# Patient Record
Sex: FEMALE | Race: WHITE | HISPANIC OR LATINO | Employment: FULL TIME | URBAN - METROPOLITAN AREA
[De-identification: names, ages, dates, MRNs, and addresses within clinical notes are randomized per-mention and may not be internally consistent; named-entity substitution may affect disease eponyms.]

---

## 2017-07-11 ENCOUNTER — OFFICE VISIT (OUTPATIENT)
Dept: FAMILY MEDICINE | Facility: CLINIC | Age: 37
End: 2017-07-11
Payer: COMMERCIAL

## 2017-07-11 VITALS
DIASTOLIC BLOOD PRESSURE: 78 MMHG | OXYGEN SATURATION: 99 % | TEMPERATURE: 99 F | WEIGHT: 160.94 LBS | SYSTOLIC BLOOD PRESSURE: 134 MMHG | HEART RATE: 90 BPM | BODY MASS INDEX: 30.39 KG/M2 | RESPIRATION RATE: 18 BRPM | HEIGHT: 61 IN

## 2017-07-11 DIAGNOSIS — F41.9 ANXIETY: ICD-10-CM

## 2017-07-11 DIAGNOSIS — R09.A2 GLOBUS SENSATION: ICD-10-CM

## 2017-07-11 DIAGNOSIS — F33.2 SEVERE EPISODE OF RECURRENT MAJOR DEPRESSIVE DISORDER, WITHOUT PSYCHOTIC FEATURES: ICD-10-CM

## 2017-07-11 PROBLEM — F33.9 RECURRENT MAJOR DEPRESSIVE DISORDER: Status: ACTIVE | Noted: 2017-07-11

## 2017-07-11 PROCEDURE — 99213 OFFICE O/P EST LOW 20 MIN: CPT | Mod: S$GLB,,, | Performed by: FAMILY MEDICINE

## 2017-07-11 RX ORDER — ESCITALOPRAM OXALATE 10 MG/1
10 TABLET ORAL DAILY
Qty: 30 TABLET | Refills: 11 | Status: SHIPPED | OUTPATIENT
Start: 2017-07-11 | End: 2017-08-11 | Stop reason: SDUPTHER

## 2017-07-11 RX ORDER — CLONAZEPAM 0.25 MG/1
0.25 TABLET, ORALLY DISINTEGRATING ORAL 2 TIMES DAILY
Qty: 60 TABLET | Refills: 2 | Status: SHIPPED | OUTPATIENT
Start: 2017-07-11 | End: 2018-07-03 | Stop reason: SDUPTHER
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
## 2017-08-11 NOTE — TELEPHONE ENCOUNTER
Please send to [facility] in [place]. Requesting 90 day supply      escitalopram oxalate (LEXAPRO) 10 MG tablet  Take 1 tablet (10 mg total) by mouth once daily.   Normal, Disp-90 tablet, R-3

## 2017-08-12 RX ORDER — ESCITALOPRAM OXALATE 10 MG/1
10 TABLET ORAL DAILY
Qty: 90 TABLET | Refills: 3 | Status: SHIPPED | OUTPATIENT
Start: 2017-08-12 | End: 2018-07-03 | Stop reason: SDUPTHER

## 2018-07-03 ENCOUNTER — OFFICE VISIT (OUTPATIENT)
Dept: FAMILY MEDICINE | Facility: CLINIC | Age: 38
End: 2018-07-03
Payer: COMMERCIAL

## 2018-07-03 VITALS
SYSTOLIC BLOOD PRESSURE: 160 MMHG | HEART RATE: 72 BPM | DIASTOLIC BLOOD PRESSURE: 110 MMHG | BODY MASS INDEX: 30.81 KG/M2 | OXYGEN SATURATION: 98 % | HEIGHT: 61 IN | TEMPERATURE: 98 F | WEIGHT: 163.19 LBS

## 2018-07-03 DIAGNOSIS — F41.9 ANXIETY: Primary | ICD-10-CM

## 2018-07-03 DIAGNOSIS — I10 ESSENTIAL HYPERTENSION: ICD-10-CM

## 2018-07-03 DIAGNOSIS — K59.00 CONSTIPATION, UNSPECIFIED CONSTIPATION TYPE: ICD-10-CM

## 2018-07-03 DIAGNOSIS — F32.A DEPRESSION, UNSPECIFIED DEPRESSION TYPE: ICD-10-CM

## 2018-07-03 DIAGNOSIS — R09.A2 GLOBUS SENSATION: ICD-10-CM

## 2018-07-03 PROBLEM — F33.9 RECURRENT MAJOR DEPRESSIVE DISORDER: Status: RESOLVED | Noted: 2017-07-11 | Resolved: 2018-07-03

## 2018-07-03 PROCEDURE — 99213 OFFICE O/P EST LOW 20 MIN: CPT | Mod: S$GLB,,, | Performed by: FAMILY MEDICINE

## 2018-07-03 PROCEDURE — 3008F BODY MASS INDEX DOCD: CPT | Mod: CPTII,S$GLB,, | Performed by: FAMILY MEDICINE

## 2018-07-03 RX ORDER — ESCITALOPRAM OXALATE 10 MG/1
10 TABLET ORAL DAILY
Qty: 90 TABLET | Refills: 3 | Status: SHIPPED | OUTPATIENT
Start: 2018-07-03 | End: 2019-08-07 | Stop reason: SDUPTHER

## 2018-07-03 RX ORDER — POLYETHYLENE GLYCOL 3350 17 G/17G
17 POWDER, FOR SOLUTION ORAL 2 TIMES DAILY PRN
Qty: 30 PACKET | Refills: 11 | Status: SHIPPED | OUTPATIENT
Start: 2018-07-03 | End: 2021-06-23

## 2018-07-03 RX ORDER — LOSARTAN POTASSIUM 100 MG/1
100 TABLET ORAL DAILY
Qty: 90 TABLET | Refills: 3 | Status: SHIPPED | OUTPATIENT
Start: 2018-07-03 | End: 2021-06-23 | Stop reason: ALTCHOICE

## 2018-07-03 RX ORDER — CLONAZEPAM 0.25 MG/1
0.25 TABLET, ORALLY DISINTEGRATING ORAL 2 TIMES DAILY
Qty: 60 TABLET | Refills: 2 | Status: SHIPPED | OUTPATIENT
Start: 2018-07-03 | End: 2021-06-23 | Stop reason: ALTCHOICE

## 2018-07-03 NOTE — PROGRESS NOTES
Subjective:      Patient ID: Janine Stubbs is a 38 y.o. female.    Chief Complaint: Follow-up and Constipation (haven't had a bowel movement in 4-5 days)      HPI   Follow up of globus sensation; doing well; no hypoglycemic episodes; makes her drowsy; takes it HS; workers notificed a difference    Review of Systems   Constitutional: Negative.    HENT: Negative.    Respiratory: Negative.    Cardiovascular: Negative.    Gastrointestinal: Positive for constipation.   Endocrine: Negative.    Genitourinary: Negative.    Musculoskeletal: Negative.    Neurological: Positive for headaches.   Psychiatric/Behavioral: Negative.    All other systems reviewed and are negative.    Objective:     Physical Exam   Constitutional: She is oriented to person, place, and time. She appears well-developed and well-nourished.   HENT:   Head: Normocephalic.   Eyes: Conjunctivae and EOM are normal. Pupils are equal, round, and reactive to light.   Neck: Normal range of motion. Neck supple.   Cardiovascular: Normal rate, regular rhythm and normal heart sounds.    bp elevated   Pulmonary/Chest: Effort normal and breath sounds normal.   Musculoskeletal: Normal range of motion.   Neurological: She is alert and oriented to person, place, and time. She has normal reflexes.   Skin: Skin is warm and dry.   Psychiatric: She has a normal mood and affect. Her behavior is normal. Judgment and thought content normal.   Nursing note and vitals reviewed.    Assessment:     1. Anxiety    2. Globus sensation    3. Depression, unspecified depression type    4. Essential hypertension    5. Constipation, unspecified constipation type      Plan:     New Prescriptions    LOSARTAN (COZAAR) 100 MG TABLET    Take 1 tablet (100 mg total) by mouth once daily.    POLYETHYLENE GLYCOL (GLYCOLAX) 17 GRAM PWPK    Take 17 g by mouth 2 (two) times daily as needed.     Discontinued Medications    No medications on file     Modified Medications    Modified Medication  Previous Medication    CLONAZEPAM (KLONOPIN) 0.25 MG TBDL clonazePAM (KLONOPIN) 0.25 MG TbDL       Take 1 tablet (0.25 mg total) by mouth 2 (two) times daily.    Take 1 tablet (0.25 mg total) by mouth 2 (two) times daily.    ESCITALOPRAM OXALATE (LEXAPRO) 10 MG TABLET escitalopram oxalate (LEXAPRO) 10 MG tablet       Take 1 tablet (10 mg total) by mouth once daily.    Take 1 tablet (10 mg total) by mouth once daily.       Anxiety  -     CBC auto differential; Future; Expected date: 07/03/2018  -     Comprehensive metabolic panel; Future; Expected date: 07/03/2018  -     Lipid panel; Future  -     TSH; Future  -     Urinalysis; Future    Globus sensation  -     CBC auto differential; Future; Expected date: 07/03/2018  -     Comprehensive metabolic panel; Future; Expected date: 07/03/2018  -     Lipid panel; Future  -     TSH; Future  -     Urinalysis; Future    Depression, unspecified depression type  -     CBC auto differential; Future; Expected date: 07/03/2018  -     Comprehensive metabolic panel; Future; Expected date: 07/03/2018  -     Lipid panel; Future  -     TSH; Future  -     Urinalysis; Future    Essential hypertension  -     CBC auto differential; Future; Expected date: 07/03/2018  -     Comprehensive metabolic panel; Future; Expected date: 07/03/2018  -     Lipid panel; Future  -     TSH; Future  -     Urinalysis; Future    Constipation, unspecified constipation type  -     CBC auto differential; Future; Expected date: 07/03/2018  -     Comprehensive metabolic panel; Future; Expected date: 07/03/2018  -     Lipid panel; Future  -     TSH; Future  -     Urinalysis; Future    Other orders  -     clonazePAM (KLONOPIN) 0.25 MG TbDL; Take 1 tablet (0.25 mg total) by mouth 2 (two) times daily.  Dispense: 60 tablet; Refill: 2  -     escitalopram oxalate (LEXAPRO) 10 MG tablet; Take 1 tablet (10 mg total) by mouth once daily.  Dispense: 90 tablet; Refill: 3  -     losartan (COZAAR) 100 MG tablet; Take 1 tablet  (100 mg total) by mouth once daily.  Dispense: 90 tablet; Refill: 3  -     polyethylene glycol (GLYCOLAX) 17 gram PwPk; Take 17 g by mouth 2 (two) times daily as needed.  Dispense: 30 packet; Refill: 11    start losartan, refill other meds, RTC 3 weeks, check labs

## 2018-07-06 ENCOUNTER — LAB VISIT (OUTPATIENT)
Dept: LAB | Facility: HOSPITAL | Age: 38
End: 2018-07-06
Attending: FAMILY MEDICINE
Payer: COMMERCIAL

## 2018-07-06 DIAGNOSIS — K59.00 CONSTIPATION, UNSPECIFIED CONSTIPATION TYPE: ICD-10-CM

## 2018-07-06 DIAGNOSIS — R09.A2 GLOBUS SENSATION: ICD-10-CM

## 2018-07-06 DIAGNOSIS — I10 ESSENTIAL HYPERTENSION: ICD-10-CM

## 2018-07-06 DIAGNOSIS — F32.A DEPRESSION, UNSPECIFIED DEPRESSION TYPE: ICD-10-CM

## 2018-07-06 DIAGNOSIS — F41.9 ANXIETY: ICD-10-CM

## 2018-07-06 LAB
ALBUMIN SERPL BCP-MCNC: 4.6 G/DL
ALP SERPL-CCNC: 73 U/L
ALT SERPL W/O P-5'-P-CCNC: 18 U/L
ANION GAP SERPL CALC-SCNC: 9 MMOL/L
AST SERPL-CCNC: 20 U/L
BASOPHILS # BLD AUTO: 0.02 K/UL
BASOPHILS NFR BLD: 0.3 %
BILIRUB SERPL-MCNC: 0.4 MG/DL
BUN SERPL-MCNC: 15 MG/DL
CALCIUM SERPL-MCNC: 8.9 MG/DL
CHLORIDE SERPL-SCNC: 102 MMOL/L
CHOLEST SERPL-MCNC: 254 MG/DL
CHOLEST/HDLC SERPL: 4.5 {RATIO}
CO2 SERPL-SCNC: 28 MMOL/L
CREAT SERPL-MCNC: 0.83 MG/DL
DIFFERENTIAL METHOD: ABNORMAL
EOSINOPHIL # BLD AUTO: 0.1 K/UL
EOSINOPHIL NFR BLD: 1.2 %
ERYTHROCYTE [DISTWIDTH] IN BLOOD BY AUTOMATED COUNT: 14.2 %
EST. GFR  (AFRICAN AMERICAN): >60 ML/MIN/1.73 M^2
EST. GFR  (NON AFRICAN AMERICAN): >60 ML/MIN/1.73 M^2
GLUCOSE SERPL-MCNC: 89 MG/DL
HCT VFR BLD AUTO: 45 %
HDLC SERPL-MCNC: 57 MG/DL
HDLC SERPL: 22.4 %
HGB BLD-MCNC: 14.3 G/DL
LDLC SERPL CALC-MCNC: 159.8 MG/DL
LYMPHOCYTES # BLD AUTO: 2.1 K/UL
LYMPHOCYTES NFR BLD: 30.1 %
MCH RBC QN AUTO: 27 PG
MCHC RBC AUTO-ENTMCNC: 31.8 G/DL
MCV RBC AUTO: 85 FL
MONOCYTES # BLD AUTO: 0.6 K/UL
MONOCYTES NFR BLD: 8.4 %
NEUTROPHILS # BLD AUTO: 4.1 K/UL
NEUTROPHILS NFR BLD: 60 %
NONHDLC SERPL-MCNC: 197 MG/DL
PLATELET # BLD AUTO: 329 K/UL
PMV BLD AUTO: 10.4 FL
POTASSIUM SERPL-SCNC: 4.5 MMOL/L
PROT SERPL-MCNC: 8.6 G/DL
RBC # BLD AUTO: 5.29 M/UL
SODIUM SERPL-SCNC: 139 MMOL/L
TRIGL SERPL-MCNC: 186 MG/DL
TSH SERPL DL<=0.005 MIU/L-ACNC: 1.4 UIU/ML
WBC # BLD AUTO: 6.8 K/UL

## 2018-07-06 PROCEDURE — 85025 COMPLETE CBC W/AUTO DIFF WBC: CPT | Mod: PO

## 2018-07-06 PROCEDURE — 36415 COLL VENOUS BLD VENIPUNCTURE: CPT | Mod: PO

## 2018-07-06 PROCEDURE — 80061 LIPID PANEL: CPT

## 2018-07-06 PROCEDURE — 80053 COMPREHEN METABOLIC PANEL: CPT | Mod: PO

## 2018-07-06 PROCEDURE — 84443 ASSAY THYROID STIM HORMONE: CPT | Mod: PO

## 2018-07-09 ENCOUNTER — TELEPHONE (OUTPATIENT)
Dept: FAMILY MEDICINE | Facility: CLINIC | Age: 38
End: 2018-07-09

## 2018-07-09 DIAGNOSIS — R77.9 ELEVATED BLOOD PROTEIN: ICD-10-CM

## 2018-07-09 DIAGNOSIS — E78.5 HYPERLIPIDEMIA, UNSPECIFIED HYPERLIPIDEMIA TYPE: Primary | ICD-10-CM

## 2018-07-09 NOTE — TELEPHONE ENCOUNTER
Left message advising patient of results; Results letter mailed.          ----- Message from Brody Omer MD sent at 7/9/2018  8:13 AM CDT -----  Cholesterol high; also, protein in blood slightly elevated; low cholesterol diet, repeat lipid and CMP 3 months

## 2018-07-17 ENCOUNTER — CLINICAL SUPPORT (OUTPATIENT)
Dept: FAMILY MEDICINE | Facility: CLINIC | Age: 38
End: 2018-07-17
Payer: COMMERCIAL

## 2018-07-17 ENCOUNTER — TELEPHONE (OUTPATIENT)
Dept: FAMILY MEDICINE | Facility: CLINIC | Age: 38
End: 2018-07-17

## 2018-07-17 VITALS — HEART RATE: 80 BPM | OXYGEN SATURATION: 99 % | SYSTOLIC BLOOD PRESSURE: 118 MMHG | DIASTOLIC BLOOD PRESSURE: 82 MMHG

## 2018-07-17 DIAGNOSIS — I10 ESSENTIAL HYPERTENSION: Primary | ICD-10-CM

## 2018-07-17 NOTE — PROGRESS NOTES
Janine Stubbs 38 y.o. female is here today for Blood Pressure check.   History of HTN yes.    Review of patient's allergies indicates:  No Known Allergies  Creatinine   Date Value Ref Range Status   07/06/2018 0.83 0.50 - 1.40 mg/dL Final     Sodium   Date Value Ref Range Status   07/06/2018 139 136 - 145 mmol/L Final     Potassium   Date Value Ref Range Status   07/06/2018 4.5 3.5 - 5.1 mmol/L Final   ]  Patient verifies taking blood pressure medications on a regular basis at the same time of the day.     Current Outpatient Prescriptions:     clonazePAM (KLONOPIN) 0.25 MG TbDL, Take 1 tablet (0.25 mg total) by mouth 2 (two) times daily., Disp: 60 tablet, Rfl: 2    escitalopram oxalate (LEXAPRO) 10 MG tablet, Take 1 tablet (10 mg total) by mouth once daily., Disp: 90 tablet, Rfl: 3    losartan (COZAAR) 100 MG tablet, Take 1 tablet (100 mg total) by mouth once daily., Disp: 90 tablet, Rfl: 3    polyethylene glycol (GLYCOLAX) 17 gram PwPk, Take 17 g by mouth 2 (two) times daily as needed., Disp: 30 packet, Rfl: 11  Does patient have record of home blood pressure readings yes. See Bp Log.   Last dose of blood pressure medication was taken at 4pm yesterday.  Patient is asymptomatic.   Complains of weakness due to low blood sugars during her cycle      Blood pressure reading after 15 minutes was 118/82, Pulse 80.  Dr. Omer was  notified.

## 2018-07-17 NOTE — TELEPHONE ENCOUNTER
Janine was here today for Blood Pressure check.     Patient verifies taking blood pressure medications on a regular basis at the same time of the day. She Takes her meds at 4 pm daily   she is taking     losartan (COZAAR) 100 MG tablet, Take 1 tablet daily     See Bp Log scanned into media   Last dose of blood pressure medication was taken at 4pm yesterday.    Complains of weakness due to low blood sugars during her cycle that have seem to have worsened since starting the new BP med   Please advise       Blood pressure was 118/82, Pulse 80 here at the office.

## 2019-01-21 ENCOUNTER — PATIENT OUTREACH (OUTPATIENT)
Dept: ADMINISTRATIVE | Facility: HOSPITAL | Age: 39
End: 2019-01-21

## 2019-02-18 ENCOUNTER — PATIENT OUTREACH (OUTPATIENT)
Dept: FAMILY MEDICINE | Facility: CLINIC | Age: 39
End: 2019-02-18

## 2019-03-20 ENCOUNTER — PATIENT OUTREACH (OUTPATIENT)
Dept: ADMINISTRATIVE | Facility: HOSPITAL | Age: 39
End: 2019-03-20

## 2019-04-05 DIAGNOSIS — Z12.39 BREAST CANCER SCREENING: ICD-10-CM

## 2019-08-01 ENCOUNTER — TELEPHONE (OUTPATIENT)
Dept: ADMINISTRATIVE | Facility: HOSPITAL | Age: 39
End: 2019-08-01

## 2019-08-07 ENCOUNTER — PATIENT OUTREACH (OUTPATIENT)
Dept: ADMINISTRATIVE | Facility: HOSPITAL | Age: 39
End: 2019-08-07

## 2019-08-07 RX ORDER — ESCITALOPRAM OXALATE 10 MG/1
TABLET ORAL
Qty: 90 TABLET | Refills: 3 | Status: SHIPPED | OUTPATIENT
Start: 2019-08-07 | End: 2021-06-23

## 2019-09-12 ENCOUNTER — TELEPHONE (OUTPATIENT)
Dept: ADMINISTRATIVE | Facility: OTHER | Age: 39
End: 2019-09-12

## 2019-09-16 ENCOUNTER — TELEPHONE (OUTPATIENT)
Dept: ADMINISTRATIVE | Facility: OTHER | Age: 39
End: 2019-09-16

## 2019-09-16 NOTE — TELEPHONE ENCOUNTER
----- Message from Rashida Powell RN sent at 9/12/2019  3:27 PM CDT -----  Regarding: FW: External Patient Referral  Can you please schedule pt for consult?    Thanks  ----- Message -----  From: Leslie Alaniz MD  Sent: 9/12/2019   3:21 PM  To: Rashida Powell RN  Subject: RE: External Patient Referral                    Sure  ----- Message -----  From: Rashida Powell RN  Sent: 9/12/2019   1:48 PM  To: Leslie Alaniz MD  Subject: FW: External Patient Referral                    Is it okay to schedule pt for a consult?    Rashida  ----- Message -----  From: Freda Nova  Sent: 9/11/2019   1:19 PM  To: [facility] Staff  Subject: External Patient Referral                        Good Afternoon,    Dr Gerardo would like to refer the following patient to Dr. Alaniz in the GYN ONC department. The patients diagnosis is palpable nodule in vulva below 11:00 position under skin palpable and tender. I have scanned the patients referral and records into .      Please let me know if I can help schedule in any way.    Thank you,   Freda Velásquez

## 2019-10-15 ENCOUNTER — TELEPHONE (OUTPATIENT)
Dept: GYNECOLOGIC ONCOLOGY | Facility: CLINIC | Age: 39
End: 2019-10-15

## 2019-10-15 ENCOUNTER — INITIAL CONSULT (OUTPATIENT)
Dept: GYNECOLOGIC ONCOLOGY | Facility: CLINIC | Age: 39
End: 2019-10-15
Payer: COMMERCIAL

## 2019-10-15 VITALS
DIASTOLIC BLOOD PRESSURE: 85 MMHG | SYSTOLIC BLOOD PRESSURE: 120 MMHG | BODY MASS INDEX: 29.35 KG/M2 | HEART RATE: 91 BPM | HEIGHT: 61 IN | WEIGHT: 155.44 LBS

## 2019-10-15 DIAGNOSIS — N89.8 VAGINAL CYST: Primary | ICD-10-CM

## 2019-10-15 PROCEDURE — 99999 PR PBB SHADOW E&M-EST. PATIENT-LVL III: CPT | Mod: PBBFAC,,, | Performed by: OBSTETRICS & GYNECOLOGY

## 2019-10-15 PROCEDURE — 99243 OFF/OP CNSLTJ NEW/EST LOW 30: CPT | Mod: S$GLB,,, | Performed by: OBSTETRICS & GYNECOLOGY

## 2019-10-15 PROCEDURE — 99243 PR OFFICE CONSULTATION,LEVEL III: ICD-10-PCS | Mod: S$GLB,,, | Performed by: OBSTETRICS & GYNECOLOGY

## 2019-10-15 PROCEDURE — 99999 PR PBB SHADOW E&M-EST. PATIENT-LVL III: ICD-10-PCS | Mod: PBBFAC,,, | Performed by: OBSTETRICS & GYNECOLOGY

## 2019-10-15 RX ORDER — LISINOPRIL AND HYDROCHLOROTHIAZIDE 10; 12.5 MG/1; MG/1
1 TABLET ORAL DAILY
Refills: 6 | COMMUNITY
Start: 2019-08-08

## 2019-10-15 RX ORDER — ROSUVASTATIN CALCIUM 5 MG/1
5 TABLET, COATED ORAL DAILY
Refills: 6 | COMMUNITY
Start: 2019-08-27 | End: 2021-06-23 | Stop reason: SDUPTHER

## 2019-10-15 RX ORDER — CEFUROXIME AXETIL 250 MG/1
TABLET ORAL
Refills: 0 | COMMUNITY
Start: 2019-08-15 | End: 2021-06-23 | Stop reason: ALTCHOICE

## 2019-10-15 NOTE — PROGRESS NOTES
"Subjective:      Patient ID: Janine Stubbs is a 39 y.o. female.    Chief Complaint: Advice Only (vaginal nodule)      HPI     39 yr old para referred from Dr. Gerardo for a palpable vulva nodule.     Per his note, "palpable nodule in vulva below 11 o'clock position, tender to touch, symptoms started a month ago". She reports otherwise is not aware of it. Only tender to palpation and she is not sexually active.     LMP, currently on cycle.    Review of Systems   Constitutional: Negative for appetite change, chills, diaphoresis, fatigue, fever and unexpected weight change.   Respiratory: Negative for cough, chest tightness, shortness of breath and wheezing.    Cardiovascular: Negative for chest pain, palpitations and leg swelling.   Gastrointestinal: Negative for abdominal distention, abdominal pain, blood in stool, constipation, diarrhea, nausea and vomiting.   Genitourinary: Negative for difficulty urinating, dyspareunia (not sexually active), dysuria, flank pain, frequency, hematuria, menstrual problem, pelvic pain, vaginal bleeding, vaginal discharge and vaginal pain (cyst tender w palpation).   Musculoskeletal: Negative for arthralgias and back pain.   Skin: Negative for color change and rash.   Neurological: Negative for dizziness, weakness, numbness and headaches.   Hematological: Negative for adenopathy.   Psychiatric/Behavioral: Negative for confusion and sleep disturbance. The patient is not nervous/anxious.        Past Medical History:   Diagnosis Date    Depression 11/5/2014    GERD (gastroesophageal reflux disease) 11/5/2014    Hypertension     with pregnancy     Past Surgical History:   Procedure Laterality Date    CARPAL TUNNEL RELEASE Right     KNEE SURGERY Right     arthroscopy    no past surgical history       Family History   Problem Relation Age of Onset    Hypertension Mother     No Known Problems Father     Diabetes Maternal Grandmother     No Known Problems Brother     No Known " Problems Son     No Known Problems Brother      Social History     Socioeconomic History    Marital status:      Spouse name: Not on file    Number of children: 1    Years of education: Not on file    Highest education level: Not on file   Occupational History     Employer: [employer]   Social Needs    Financial resource strain: Not on file    Food insecurity:     Worry: Not on file     Inability: Not on file    Transportation needs:     Medical: Not on file     Non-medical: Not on file   Tobacco Use    Smoking status: Never Smoker    Smokeless tobacco: Never Used   Substance and Sexual Activity    Alcohol use: No    Drug use: Never    Sexual activity: Not Currently   Lifestyle    Physical activity:     Days per week: Not on file     Minutes per session: Not on file    Stress: Not on file   Relationships    Social connections:     Talks on phone: Not on file     Gets together: Not on file     Attends [employer] service: Not on file     Active member of club or organization: Not on file     Attends meetings of clubs or organizations: Not on file     Relationship status: Not on file   Other Topics Concern    Not on file   Social History Narrative    Not on file     Current Outpatient Medications   Medication Sig    lisinopril-hydrochlorothiazide (PRINZIDE,ZESTORETIC) 10-12.5 mg per tablet Take 1 tablet by mouth once daily.    rosuvastatin (CRESTOR) 5 MG tablet Take 5 mg by mouth once daily.    cefUROXime (CEFTIN) 250 MG tablet TAKE 1 TABLET BY MOUTH TWICE A DAY FOR SINUS INFECTION    clonazePAM (KLONOPIN) 0.25 MG TbDL Take 1 tablet (0.25 mg total) by mouth 2 (two) times daily.    escitalopram oxalate (LEXAPRO) 10 MG tablet TAKE 1 TABLET BY MOUTH EVERY DAY (Patient not taking: Reported on 10/15/2019)    losartan (COZAAR) 100 MG tablet Take 1 tablet (100 mg total) by mouth once daily.    polyethylene glycol (GLYCOLAX) 17 gram PwPk Take 17 g by mouth 2 (two) times daily as needed. (Patient  "not taking: Reported on 10/15/2019)     No current facility-administered medications for this visit.      Review of patient's allergies indicates:  No Known Allergies     /85   Pulse 91   Ht 5' 1" (1.549 m)   Wt 70.5 kg (155 lb 6.8 oz)   BMI 29.37 kg/m²       Objective:   Physical Exam:   Constitutional: She is oriented to person, place, and time. She appears well-developed and well-nourished. No distress.    HENT:   Head: Normocephalic and atraumatic.    Eyes: No scleral icterus.    Neck: Normal range of motion.    Cardiovascular: Exam reveals no cyanosis and no edema.     Pulmonary/Chest: Effort normal. No respiratory distress.        Abdominal: Soft. She exhibits no distension.     Genitourinary: Vagina normal.       Pelvic exam was performed with patient supine. There is no rash, tenderness or lesion on the right labia. There is no rash, tenderness or lesion on the left labia. No vaginal discharge found. Labial bartholins normal.  Genitourinary Comments: Mildly tender to palpation at R vaginal side wall at introitus, approx 9-11 o'clock. Able to place speculum without discomfort. No cyst, mass or nodule palpated           Musculoskeletal: Normal range of motion and moves all extremeties. She exhibits no edema.      Lymphadenopathy:        Right: No inguinal adenopathy present.        Left: No inguinal adenopathy present.    Neurological: She is alert and oriented to person, place, and time.    Skin: Skin is warm and dry. No rash noted. No cyanosis or erythema. No pallor.    Psychiatric: She has a normal mood and affect. Her behavior is normal.       Assessment:     1. Vaginal cyst        Plan:       Agree with Dr. Gerardo that this is likely a small inclusion cyst. Also agree options include surgical excision versus observation. At this time given her symptoms are minimal and size is fairly small I would favor observation. No suspicion for malignancy. Can reconsider surgical excision if size or " symptomatology changes. Happy to continue to follow her with Dr. Gerardo will update him with our discussions today.

## 2019-10-15 NOTE — LETTER
October 20, 2019      Joseph Gerardo MD  [address]  [place] [address]           [facility] [address]  [address], [address]  [place] [address]  Phone: [phone]  Fax: [phone]          Patient: Janine Stubbs   MR Number: [id number]   YOB: 1980   Date of Visit: 10/15/2019       Dear Dr. Joseph Gerardo:    Thank you for referring Janine Stubbs to me for evaluation. Attached you will find relevant portions of my assessment and plan of care.    If you have questions, please do not hesitate to call me. I look forward to following Janine Stubbs along with you.    Sincerely,    Leslie Alaniz MD    Enclosure  CC:  No Recipients    If you would like to receive this communication electronically, please contact [email or web address] or [phone] to request more information on [facility] access.    For providers and/or their staff who would like to refer a patient to [facility], please contact us through our one-stop-shop provider referral line, [facility], at [phone].    If you feel you have received this communication in error or would no longer like to receive these types of communications, please e-mail [email or web address]

## 2019-11-11 ENCOUNTER — PATIENT OUTREACH (OUTPATIENT)
Dept: ADMINISTRATIVE | Facility: HOSPITAL | Age: 39
End: 2019-11-11

## 2020-02-03 ENCOUNTER — PATIENT OUTREACH (OUTPATIENT)
Dept: ADMINISTRATIVE | Facility: HOSPITAL | Age: 40
End: 2020-02-03

## 2020-04-14 ENCOUNTER — PATIENT OUTREACH (OUTPATIENT)
Dept: ADMINISTRATIVE | Facility: HOSPITAL | Age: 40
End: 2020-04-14

## 2020-06-05 DIAGNOSIS — Z12.39 BREAST CANCER SCREENING: ICD-10-CM

## 2021-04-16 ENCOUNTER — HOSPITAL ENCOUNTER (EMERGENCY)
Facility: HOSPITAL | Age: 41
Discharge: HOME | End: 2021-04-17
Attending: EMERGENCY MEDICINE
Payer: COMMERCIAL

## 2021-04-16 DIAGNOSIS — R51.9 NONINTRACTABLE HEADACHE, UNSPECIFIED CHRONICITY PATTERN, UNSPECIFIED HEADACHE TYPE: ICD-10-CM

## 2021-04-16 DIAGNOSIS — R42 DIZZINESS: ICD-10-CM

## 2021-04-16 DIAGNOSIS — R53.1 GENERALIZED WEAKNESS: ICD-10-CM

## 2021-04-16 DIAGNOSIS — M79.602 LEFT ARM PAIN: ICD-10-CM

## 2021-04-16 DIAGNOSIS — R06.02 SHORTNESS OF BREATH: ICD-10-CM

## 2021-04-16 LAB — B-HCG UR QL: NEGATIVE

## 2021-04-16 PROCEDURE — 99285 EMERGENCY DEPT VISIT HI MDM: CPT | Mod: 25,ER

## 2021-04-16 PROCEDURE — 96360 HYDRATION IV INFUSION INIT: CPT | Mod: ER

## 2021-04-16 PROCEDURE — 93005 ELECTROCARDIOGRAM TRACING: CPT | Mod: ER

## 2021-04-16 PROCEDURE — 81025 URINE PREGNANCY TEST: CPT | Mod: ER | Performed by: EMERGENCY MEDICINE

## 2021-04-16 PROCEDURE — 93010 ELECTROCARDIOGRAM REPORT: CPT | Mod: ,,, | Performed by: INTERNAL MEDICINE

## 2021-04-16 PROCEDURE — 93010 EKG 12-LEAD: ICD-10-PCS | Mod: ,,, | Performed by: INTERNAL MEDICINE

## 2021-04-16 RX ORDER — ONDANSETRON 2 MG/ML
4 INJECTION INTRAMUSCULAR; INTRAVENOUS
Status: DISCONTINUED | OUTPATIENT
Start: 2021-04-17 | End: 2021-04-17 | Stop reason: HOSPADM

## 2021-04-17 VITALS
DIASTOLIC BLOOD PRESSURE: 86 MMHG | RESPIRATION RATE: 24 BRPM | TEMPERATURE: 99 F | HEART RATE: 85 BPM | BODY MASS INDEX: 30.21 KG/M2 | OXYGEN SATURATION: 98 % | WEIGHT: 160 LBS | SYSTOLIC BLOOD PRESSURE: 134 MMHG | HEIGHT: 61 IN

## 2021-04-17 LAB
ALBUMIN SERPL BCP-MCNC: 5.3 G/DL (ref 3.5–5.2)
ALP SERPL-CCNC: 104 U/L (ref 38–126)
ALT SERPL W/O P-5'-P-CCNC: 25 U/L (ref 10–44)
ANION GAP SERPL CALC-SCNC: 14 MMOL/L (ref 8–16)
AST SERPL-CCNC: 28 U/L (ref 15–46)
BACTERIA #/AREA URNS AUTO: NORMAL /HPF
BASOPHILS # BLD AUTO: 0.07 K/UL (ref 0–0.2)
BASOPHILS NFR BLD: 0.7 % (ref 0–1.9)
BILIRUB SERPL-MCNC: 0.4 MG/DL (ref 0.1–1)
BILIRUB UR QL STRIP: NEGATIVE
CALCIUM SERPL-MCNC: 10.2 MG/DL (ref 8.7–10.5)
CHLORIDE SERPL-SCNC: 100 MMOL/L (ref 95–110)
CLARITY UR REFRACT.AUTO: CLEAR
CO2 SERPL-SCNC: 29 MMOL/L (ref 23–29)
COLOR UR AUTO: ABNORMAL
CREAT SERPL-MCNC: 0.86 MG/DL (ref 0.5–1.4)
DIFFERENTIAL METHOD: ABNORMAL
EOSINOPHIL # BLD AUTO: 0.1 K/UL (ref 0–0.5)
EOSINOPHIL NFR BLD: 0.9 % (ref 0–8)
ERYTHROCYTE [DISTWIDTH] IN BLOOD BY AUTOMATED COUNT: 13.2 % (ref 11.5–14.5)
EST. GFR  (AFRICAN AMERICAN): >60 ML/MIN/1.73 M^2
EST. GFR  (NON AFRICAN AMERICAN): >60 ML/MIN/1.73 M^2
GLUCOSE SERPL-MCNC: 105 MG/DL (ref 70–110)
GLUCOSE UR QL STRIP: NEGATIVE
HCT VFR BLD AUTO: 44.7 % (ref 37–48.5)
HGB BLD-MCNC: 14.6 G/DL (ref 12–16)
HGB UR QL STRIP: ABNORMAL
IMM GRANULOCYTES # BLD AUTO: 0.03 K/UL (ref 0–0.04)
IMM GRANULOCYTES NFR BLD AUTO: 0.3 % (ref 0–0.5)
KETONES UR QL STRIP: NEGATIVE
LEUKOCYTE ESTERASE UR QL STRIP: NEGATIVE
LYMPHOCYTES # BLD AUTO: 1.7 K/UL (ref 1–4.8)
LYMPHOCYTES NFR BLD: 17 % (ref 18–48)
MAGNESIUM SERPL-MCNC: 2.1 MG/DL (ref 1.6–2.6)
MCH RBC QN AUTO: 29.5 PG (ref 27–31)
MCHC RBC AUTO-ENTMCNC: 32.7 G/DL (ref 32–36)
MCV RBC AUTO: 90 FL (ref 82–98)
MICROSCOPIC COMMENT: NORMAL
MONOCYTES # BLD AUTO: 0.6 K/UL (ref 0.3–1)
MONOCYTES NFR BLD: 6.2 % (ref 4–15)
NEUTROPHILS # BLD AUTO: 7.5 K/UL (ref 1.8–7.7)
NEUTROPHILS NFR BLD: 74.9 % (ref 38–73)
NITRITE UR QL STRIP: NEGATIVE
NRBC BLD-RTO: 0 /100 WBC
PH UR STRIP: 7 [PH] (ref 5–8)
PLATELET # BLD AUTO: 406 K/UL (ref 150–450)
PMV BLD AUTO: 9.7 FL (ref 9.2–12.9)
POTASSIUM SERPL-SCNC: 3.9 MMOL/L (ref 3.5–5.1)
PROT SERPL-MCNC: 9.5 G/DL (ref 6–8.4)
PROT UR QL STRIP: NEGATIVE
RBC # BLD AUTO: 4.95 M/UL (ref 4–5.4)
RBC #/AREA URNS AUTO: 1 /HPF (ref 0–4)
SODIUM SERPL-SCNC: 143 MMOL/L (ref 136–145)
SP GR UR STRIP: 1 (ref 1–1.03)
TSH SERPL DL<=0.005 MIU/L-ACNC: 2.42 UIU/ML (ref 0.4–4)
URN SPEC COLLECT METH UR: ABNORMAL
UROBILINOGEN UR STRIP-ACNC: NEGATIVE EU/DL
UUN UR-MCNC: 16 MG/DL (ref 7–17)
WBC # BLD AUTO: 10.06 K/UL (ref 3.9–12.7)
WBC #/AREA URNS AUTO: 0 /HPF (ref 0–5)

## 2021-04-17 PROCEDURE — 80053 COMPREHEN METABOLIC PANEL: CPT | Mod: ER | Performed by: EMERGENCY MEDICINE

## 2021-04-17 PROCEDURE — 85025 COMPLETE CBC W/AUTO DIFF WBC: CPT | Mod: ER | Performed by: EMERGENCY MEDICINE

## 2021-04-17 PROCEDURE — 25000003 PHARM REV CODE 250: Mod: ER | Performed by: EMERGENCY MEDICINE

## 2021-04-17 PROCEDURE — 84443 ASSAY THYROID STIM HORMONE: CPT | Mod: ER | Performed by: EMERGENCY MEDICINE

## 2021-04-17 PROCEDURE — 83735 ASSAY OF MAGNESIUM: CPT | Mod: ER | Performed by: EMERGENCY MEDICINE

## 2021-04-17 PROCEDURE — 81000 URINALYSIS NONAUTO W/SCOPE: CPT | Mod: ER | Performed by: EMERGENCY MEDICINE

## 2021-04-17 RX ORDER — ONDANSETRON 4 MG/1
4 TABLET, ORALLY DISINTEGRATING ORAL EVERY 6 HOURS PRN
Qty: 10 TABLET | Refills: 0 | Status: SHIPPED | OUTPATIENT
Start: 2021-04-17 | End: 2021-06-23 | Stop reason: ALTCHOICE

## 2021-04-17 RX ADMIN — SODIUM CHLORIDE 1000 ML: 0.9 INJECTION, SOLUTION INTRAVENOUS at 12:04

## 2021-06-10 ENCOUNTER — TELEPHONE (OUTPATIENT)
Dept: FAMILY MEDICINE | Facility: CLINIC | Age: 41
End: 2021-06-10

## 2021-06-10 DIAGNOSIS — Z13.1 DIABETES MELLITUS SCREENING: ICD-10-CM

## 2021-06-10 DIAGNOSIS — Z11.4 SCREENING FOR HIV WITHOUT PRESENCE OF RISK FACTORS: ICD-10-CM

## 2021-06-10 DIAGNOSIS — Z11.59 ENCOUNTER FOR HEPATITIS C SCREENING TEST FOR LOW RISK PATIENT: ICD-10-CM

## 2021-06-10 DIAGNOSIS — Z13.0 SCREENING FOR DEFICIENCY ANEMIA: ICD-10-CM

## 2021-06-10 DIAGNOSIS — Z13.220 SCREENING CHOLESTEROL LEVEL: ICD-10-CM

## 2021-06-10 DIAGNOSIS — Z00.00 ENCOUNTER FOR BLOOD TEST FOR ROUTINE GENERAL PHYSICAL EXAMINATION: Primary | ICD-10-CM

## 2021-06-10 DIAGNOSIS — Z13.29 THYROID DISORDER SCREEN: ICD-10-CM

## 2021-06-14 ENCOUNTER — LAB VISIT (OUTPATIENT)
Dept: LAB | Facility: HOSPITAL | Age: 41
End: 2021-06-14
Attending: NURSE PRACTITIONER
Payer: COMMERCIAL

## 2021-06-14 DIAGNOSIS — Z13.220 SCREENING CHOLESTEROL LEVEL: ICD-10-CM

## 2021-06-14 DIAGNOSIS — Z11.4 SCREENING FOR HIV WITHOUT PRESENCE OF RISK FACTORS: ICD-10-CM

## 2021-06-14 DIAGNOSIS — Z00.00 ENCOUNTER FOR BLOOD TEST FOR ROUTINE GENERAL PHYSICAL EXAMINATION: ICD-10-CM

## 2021-06-14 DIAGNOSIS — Z13.0 SCREENING FOR DEFICIENCY ANEMIA: ICD-10-CM

## 2021-06-14 DIAGNOSIS — Z13.1 DIABETES MELLITUS SCREENING: ICD-10-CM

## 2021-06-14 DIAGNOSIS — Z11.59 ENCOUNTER FOR HEPATITIS C SCREENING TEST FOR LOW RISK PATIENT: ICD-10-CM

## 2021-06-14 DIAGNOSIS — Z13.29 THYROID DISORDER SCREEN: ICD-10-CM

## 2021-06-14 LAB
ALBUMIN SERPL BCP-MCNC: 4.4 G/DL (ref 3.5–5.2)
ALP SERPL-CCNC: 57 U/L (ref 38–126)
ALT SERPL W/O P-5'-P-CCNC: 18 U/L (ref 10–44)
ANION GAP SERPL CALC-SCNC: 5 MMOL/L (ref 8–16)
AST SERPL-CCNC: 29 U/L (ref 15–46)
BASOPHILS # BLD AUTO: 0.05 K/UL (ref 0–0.2)
BASOPHILS NFR BLD: 0.7 % (ref 0–1.9)
BILIRUB SERPL-MCNC: 0.4 MG/DL (ref 0.1–1)
CALCIUM SERPL-MCNC: 9.1 MG/DL (ref 8.7–10.5)
CHLORIDE SERPL-SCNC: 100 MMOL/L (ref 95–110)
CHOLEST SERPL-MCNC: 209 MG/DL (ref 120–199)
CHOLEST/HDLC SERPL: 4 {RATIO} (ref 2–5)
CO2 SERPL-SCNC: 30 MMOL/L (ref 23–29)
CREAT SERPL-MCNC: 0.81 MG/DL (ref 0.5–1.4)
DIFFERENTIAL METHOD: NORMAL
EOSINOPHIL # BLD AUTO: 0.2 K/UL (ref 0–0.5)
EOSINOPHIL NFR BLD: 2.1 % (ref 0–8)
ERYTHROCYTE [DISTWIDTH] IN BLOOD BY AUTOMATED COUNT: 12.8 % (ref 11.5–14.5)
EST. GFR  (AFRICAN AMERICAN): >60 ML/MIN/1.73 M^2
EST. GFR  (NON AFRICAN AMERICAN): >60 ML/MIN/1.73 M^2
ESTIMATED AVG GLUCOSE: 100 MG/DL (ref 68–131)
GLUCOSE SERPL-MCNC: 95 MG/DL (ref 70–110)
HBA1C MFR BLD: 5.1 % (ref 4–5.6)
HCT VFR BLD AUTO: 41.5 % (ref 37–48.5)
HCV AB SERPL QL IA: NEGATIVE
HDLC SERPL-MCNC: 52 MG/DL (ref 40–75)
HDLC SERPL: 24.9 % (ref 20–50)
HGB BLD-MCNC: 13.3 G/DL (ref 12–16)
HIV 1+2 AB+HIV1 P24 AG SERPL QL IA: NEGATIVE
IMM GRANULOCYTES # BLD AUTO: 0.03 K/UL (ref 0–0.04)
IMM GRANULOCYTES NFR BLD AUTO: 0.4 % (ref 0–0.5)
LDLC SERPL CALC-MCNC: 119.4 MG/DL (ref 63–159)
LYMPHOCYTES # BLD AUTO: 2.4 K/UL (ref 1–4.8)
LYMPHOCYTES NFR BLD: 32.5 % (ref 18–48)
MCH RBC QN AUTO: 29.2 PG (ref 27–31)
MCHC RBC AUTO-ENTMCNC: 32 G/DL (ref 32–36)
MCV RBC AUTO: 91 FL (ref 82–98)
MONOCYTES # BLD AUTO: 0.5 K/UL (ref 0.3–1)
MONOCYTES NFR BLD: 6.8 % (ref 4–15)
NEUTROPHILS # BLD AUTO: 4.3 K/UL (ref 1.8–7.7)
NEUTROPHILS NFR BLD: 57.5 % (ref 38–73)
NONHDLC SERPL-MCNC: 157 MG/DL
NRBC BLD-RTO: 0 /100 WBC
PLATELET # BLD AUTO: 350 K/UL (ref 150–450)
PMV BLD AUTO: 9.9 FL (ref 9.2–12.9)
POTASSIUM SERPL-SCNC: 4.1 MMOL/L (ref 3.5–5.1)
PROT SERPL-MCNC: 8 G/DL (ref 6–8.4)
RBC # BLD AUTO: 4.55 M/UL (ref 4–5.4)
SODIUM SERPL-SCNC: 135 MMOL/L (ref 136–145)
TRIGL SERPL-MCNC: 188 MG/DL (ref 30–150)
TSH SERPL DL<=0.005 MIU/L-ACNC: 2.42 UIU/ML (ref 0.4–4)
UUN UR-MCNC: 14 MG/DL (ref 7–17)
WBC # BLD AUTO: 7.5 K/UL (ref 3.9–12.7)

## 2021-06-14 PROCEDURE — 84443 ASSAY THYROID STIM HORMONE: CPT | Mod: PO | Performed by: NURSE PRACTITIONER

## 2021-06-14 PROCEDURE — 80053 COMPREHEN METABOLIC PANEL: CPT | Mod: PO | Performed by: NURSE PRACTITIONER

## 2021-06-14 PROCEDURE — 83036 HEMOGLOBIN GLYCOSYLATED A1C: CPT | Performed by: NURSE PRACTITIONER

## 2021-06-14 PROCEDURE — 80061 LIPID PANEL: CPT | Performed by: NURSE PRACTITIONER

## 2021-06-14 PROCEDURE — 36415 COLL VENOUS BLD VENIPUNCTURE: CPT | Mod: PO | Performed by: NURSE PRACTITIONER

## 2021-06-14 PROCEDURE — 86703 HIV-1/HIV-2 1 RESULT ANTBDY: CPT | Mod: PO | Performed by: NURSE PRACTITIONER

## 2021-06-14 PROCEDURE — 85025 COMPLETE CBC W/AUTO DIFF WBC: CPT | Mod: PO | Performed by: NURSE PRACTITIONER

## 2021-06-14 PROCEDURE — 86803 HEPATITIS C AB TEST: CPT | Mod: PO | Performed by: NURSE PRACTITIONER

## 2021-06-23 ENCOUNTER — OFFICE VISIT (OUTPATIENT)
Dept: FAMILY MEDICINE | Facility: CLINIC | Age: 41
End: 2021-06-23
Payer: COMMERCIAL

## 2021-06-23 ENCOUNTER — TELEPHONE (OUTPATIENT)
Dept: ADMINISTRATIVE | Facility: HOSPITAL | Age: 41
End: 2021-06-23

## 2021-06-23 VITALS
OXYGEN SATURATION: 98 % | WEIGHT: 163.5 LBS | HEIGHT: 62 IN | TEMPERATURE: 98 F | HEART RATE: 83 BPM | BODY MASS INDEX: 30.09 KG/M2 | DIASTOLIC BLOOD PRESSURE: 84 MMHG | RESPIRATION RATE: 15 BRPM | SYSTOLIC BLOOD PRESSURE: 126 MMHG

## 2021-06-23 DIAGNOSIS — E78.2 MIXED HYPERLIPIDEMIA: ICD-10-CM

## 2021-06-23 DIAGNOSIS — Z00.00 ANNUAL PHYSICAL EXAM: Primary | ICD-10-CM

## 2021-06-23 DIAGNOSIS — Z12.39 ENCOUNTER FOR SCREENING FOR MALIGNANT NEOPLASM OF BREAST, UNSPECIFIED SCREENING MODALITY: ICD-10-CM

## 2021-06-23 DIAGNOSIS — I10 ESSENTIAL HYPERTENSION: ICD-10-CM

## 2021-06-23 DIAGNOSIS — E16.2 HYPOGLYCEMIA: ICD-10-CM

## 2021-06-23 PROBLEM — F41.9 ANXIETY: Status: RESOLVED | Noted: 2017-07-11 | Resolved: 2021-06-23

## 2021-06-23 PROBLEM — R09.A2 GLOBUS SENSATION: Status: RESOLVED | Noted: 2017-07-11 | Resolved: 2021-06-23

## 2021-06-23 PROCEDURE — 99999 PR PBB SHADOW E&M-EST. PATIENT-LVL IV: CPT | Mod: PBBFAC,,, | Performed by: NURSE PRACTITIONER

## 2021-06-23 PROCEDURE — 1126F AMNT PAIN NOTED NONE PRSNT: CPT | Mod: S$GLB,,, | Performed by: NURSE PRACTITIONER

## 2021-06-23 PROCEDURE — 99999 PR PBB SHADOW E&M-EST. PATIENT-LVL IV: ICD-10-PCS | Mod: PBBFAC,,, | Performed by: NURSE PRACTITIONER

## 2021-06-23 PROCEDURE — 3008F BODY MASS INDEX DOCD: CPT | Mod: CPTII,S$GLB,, | Performed by: NURSE PRACTITIONER

## 2021-06-23 PROCEDURE — 3008F PR BODY MASS INDEX (BMI) DOCUMENTED: ICD-10-PCS | Mod: CPTII,S$GLB,, | Performed by: NURSE PRACTITIONER

## 2021-06-23 PROCEDURE — 99396 PREV VISIT EST AGE 40-64: CPT | Mod: S$GLB,,, | Performed by: NURSE PRACTITIONER

## 2021-06-23 PROCEDURE — 99396 PR PREVENTIVE VISIT,EST,40-64: ICD-10-PCS | Mod: S$GLB,,, | Performed by: NURSE PRACTITIONER

## 2021-06-23 PROCEDURE — 1126F PR PAIN SEVERITY QUANTIFIED, NO PAIN PRESENT: ICD-10-PCS | Mod: S$GLB,,, | Performed by: NURSE PRACTITIONER

## 2021-06-23 RX ORDER — ROSUVASTATIN CALCIUM 10 MG/1
10 TABLET, COATED ORAL DAILY
Qty: 30 TABLET | Refills: 2 | Status: SHIPPED | OUTPATIENT
Start: 2021-06-23 | End: 2022-03-28

## 2021-06-23 RX ORDER — IBUPROFEN 800 MG/1
800 TABLET ORAL 3 TIMES DAILY PRN
COMMUNITY
Start: 2021-04-26

## 2021-06-30 ENCOUNTER — OFFICE VISIT (OUTPATIENT)
Dept: ENDOCRINOLOGY | Facility: CLINIC | Age: 41
End: 2021-06-30
Payer: COMMERCIAL

## 2021-06-30 VITALS
HEIGHT: 62 IN | WEIGHT: 161.38 LBS | BODY MASS INDEX: 29.7 KG/M2 | SYSTOLIC BLOOD PRESSURE: 126 MMHG | RESPIRATION RATE: 16 BRPM | DIASTOLIC BLOOD PRESSURE: 82 MMHG

## 2021-06-30 DIAGNOSIS — R06.83 SNORING: ICD-10-CM

## 2021-06-30 DIAGNOSIS — E16.2 HYPOGLYCEMIA: Primary | ICD-10-CM

## 2021-06-30 PROCEDURE — 99999 PR PBB SHADOW E&M-EST. PATIENT-LVL IV: ICD-10-PCS | Mod: PBBFAC,,, | Performed by: INTERNAL MEDICINE

## 2021-06-30 PROCEDURE — 3008F BODY MASS INDEX DOCD: CPT | Mod: CPTII,S$GLB,, | Performed by: INTERNAL MEDICINE

## 2021-06-30 PROCEDURE — 99204 OFFICE O/P NEW MOD 45 MIN: CPT | Mod: S$GLB,,, | Performed by: INTERNAL MEDICINE

## 2021-06-30 PROCEDURE — 1126F PR PAIN SEVERITY QUANTIFIED, NO PAIN PRESENT: ICD-10-PCS | Mod: S$GLB,,, | Performed by: INTERNAL MEDICINE

## 2021-06-30 PROCEDURE — 99999 PR PBB SHADOW E&M-EST. PATIENT-LVL IV: CPT | Mod: PBBFAC,,, | Performed by: INTERNAL MEDICINE

## 2021-06-30 PROCEDURE — 1126F AMNT PAIN NOTED NONE PRSNT: CPT | Mod: S$GLB,,, | Performed by: INTERNAL MEDICINE

## 2021-06-30 PROCEDURE — 3008F PR BODY MASS INDEX (BMI) DOCUMENTED: ICD-10-PCS | Mod: CPTII,S$GLB,, | Performed by: INTERNAL MEDICINE

## 2021-06-30 PROCEDURE — 99204 PR OFFICE/OUTPT VISIT, NEW, LEVL IV, 45-59 MIN: ICD-10-PCS | Mod: S$GLB,,, | Performed by: INTERNAL MEDICINE

## 2021-07-14 ENCOUNTER — HOSPITAL ENCOUNTER (OUTPATIENT)
Dept: RADIOLOGY | Facility: HOSPITAL | Age: 41
Discharge: HOME | End: 2021-07-14
Attending: NURSE PRACTITIONER
Payer: COMMERCIAL

## 2021-07-14 DIAGNOSIS — Z12.39 ENCOUNTER FOR SCREENING FOR MALIGNANT NEOPLASM OF BREAST, UNSPECIFIED SCREENING MODALITY: ICD-10-CM

## 2021-07-14 PROCEDURE — 77067 SCR MAMMO BI INCL CAD: CPT | Mod: TC,PO

## 2021-08-17 ENCOUNTER — OFFICE VISIT (OUTPATIENT)
Dept: OBSTETRICS AND GYNECOLOGY | Facility: CLINIC | Age: 41
End: 2021-08-17
Payer: COMMERCIAL

## 2021-08-17 VITALS
SYSTOLIC BLOOD PRESSURE: 124 MMHG | WEIGHT: 159.38 LBS | HEIGHT: 62 IN | BODY MASS INDEX: 29.33 KG/M2 | DIASTOLIC BLOOD PRESSURE: 76 MMHG

## 2021-08-17 DIAGNOSIS — M79.622 PAIN IN LEFT AXILLA: ICD-10-CM

## 2021-08-17 DIAGNOSIS — N89.8 VAGINAL IRRITATION: ICD-10-CM

## 2021-08-17 DIAGNOSIS — Z01.419 ENCOUNTER FOR ANNUAL ROUTINE GYNECOLOGICAL EXAMINATION: Primary | ICD-10-CM

## 2021-08-17 DIAGNOSIS — Z12.4 PAP SMEAR FOR CERVICAL CANCER SCREENING: ICD-10-CM

## 2021-08-17 DIAGNOSIS — M79.621 PAIN, AXILLARY, RIGHT: ICD-10-CM

## 2021-08-17 DIAGNOSIS — Q83.1 AXILLARY ACCESSORY BREAST TISSUE: ICD-10-CM

## 2021-08-17 PROCEDURE — 99396 PR PREVENTIVE VISIT,EST,40-64: ICD-10-PCS | Mod: S$GLB,,, | Performed by: OBSTETRICS & GYNECOLOGY

## 2021-08-17 PROCEDURE — 3074F PR MOST RECENT SYSTOLIC BLOOD PRESSURE < 130 MM HG: ICD-10-PCS | Mod: CPTII,S$GLB,, | Performed by: OBSTETRICS & GYNECOLOGY

## 2021-08-17 PROCEDURE — 99396 PREV VISIT EST AGE 40-64: CPT | Mod: S$GLB,,, | Performed by: OBSTETRICS & GYNECOLOGY

## 2021-08-17 PROCEDURE — 3074F SYST BP LT 130 MM HG: CPT | Mod: CPTII,S$GLB,, | Performed by: OBSTETRICS & GYNECOLOGY

## 2021-08-17 PROCEDURE — 1159F PR MEDICATION LIST DOCUMENTED IN MEDICAL RECORD: ICD-10-PCS | Mod: CPTII,S$GLB,, | Performed by: OBSTETRICS & GYNECOLOGY

## 2021-08-17 PROCEDURE — 99999 PR PBB SHADOW E&M-EST. PATIENT-LVL III: ICD-10-PCS | Mod: PBBFAC,,, | Performed by: OBSTETRICS & GYNECOLOGY

## 2021-08-17 PROCEDURE — 87481 CANDIDA DNA AMP PROBE: CPT | Mod: 59 | Performed by: OBSTETRICS & GYNECOLOGY

## 2021-08-17 PROCEDURE — 1159F MED LIST DOCD IN RCRD: CPT | Mod: CPTII,S$GLB,, | Performed by: OBSTETRICS & GYNECOLOGY

## 2021-08-17 PROCEDURE — 87491 CHLMYD TRACH DNA AMP PROBE: CPT | Mod: 59 | Performed by: OBSTETRICS & GYNECOLOGY

## 2021-08-17 PROCEDURE — 88175 CYTOPATH C/V AUTO FLUID REDO: CPT | Performed by: OBSTETRICS & GYNECOLOGY

## 2021-08-17 PROCEDURE — 3078F DIAST BP <80 MM HG: CPT | Mod: CPTII,S$GLB,, | Performed by: OBSTETRICS & GYNECOLOGY

## 2021-08-17 PROCEDURE — 1160F PR REVIEW ALL MEDS BY PRESCRIBER/CLIN PHARMACIST DOCUMENTED: ICD-10-PCS | Mod: CPTII,S$GLB,, | Performed by: OBSTETRICS & GYNECOLOGY

## 2021-08-17 PROCEDURE — 3044F HG A1C LEVEL LT 7.0%: CPT | Mod: CPTII,S$GLB,, | Performed by: OBSTETRICS & GYNECOLOGY

## 2021-08-17 PROCEDURE — 1160F RVW MEDS BY RX/DR IN RCRD: CPT | Mod: CPTII,S$GLB,, | Performed by: OBSTETRICS & GYNECOLOGY

## 2021-08-17 PROCEDURE — 99999 PR PBB SHADOW E&M-EST. PATIENT-LVL III: CPT | Mod: PBBFAC,,, | Performed by: OBSTETRICS & GYNECOLOGY

## 2021-08-17 PROCEDURE — 87591 N.GONORRHOEAE DNA AMP PROB: CPT | Mod: 59 | Performed by: OBSTETRICS & GYNECOLOGY

## 2021-08-17 PROCEDURE — 3008F BODY MASS INDEX DOCD: CPT | Mod: CPTII,S$GLB,, | Performed by: OBSTETRICS & GYNECOLOGY

## 2021-08-17 PROCEDURE — 3044F PR MOST RECENT HEMOGLOBIN A1C LEVEL <7.0%: ICD-10-PCS | Mod: CPTII,S$GLB,, | Performed by: OBSTETRICS & GYNECOLOGY

## 2021-08-17 PROCEDURE — 3008F PR BODY MASS INDEX (BMI) DOCUMENTED: ICD-10-PCS | Mod: CPTII,S$GLB,, | Performed by: OBSTETRICS & GYNECOLOGY

## 2021-08-17 PROCEDURE — 3078F PR MOST RECENT DIASTOLIC BLOOD PRESSURE < 80 MM HG: ICD-10-PCS | Mod: CPTII,S$GLB,, | Performed by: OBSTETRICS & GYNECOLOGY

## 2021-08-17 PROCEDURE — 87624 HPV HI-RISK TYP POOLED RSLT: CPT | Performed by: OBSTETRICS & GYNECOLOGY

## 2021-08-17 RX ORDER — LISINOPRIL 10 MG/1
10 TABLET ORAL DAILY
COMMUNITY
Start: 2021-07-19

## 2021-08-18 ENCOUNTER — TELEPHONE (OUTPATIENT)
Dept: OBSTETRICS AND GYNECOLOGY | Facility: HOSPITAL | Age: 41
End: 2021-08-18

## 2021-08-18 ENCOUNTER — HOSPITAL ENCOUNTER (OUTPATIENT)
Dept: RADIOLOGY | Facility: HOSPITAL | Age: 41
Discharge: HOME | End: 2021-08-18
Attending: OBSTETRICS & GYNECOLOGY
Payer: COMMERCIAL

## 2021-08-18 DIAGNOSIS — Q83.1 AXILLARY ACCESSORY BREAST TISSUE: ICD-10-CM

## 2021-08-18 PROCEDURE — 76882 US LMTD JT/FCL EVL NVASC XTR: CPT | Mod: TC,PO,LT

## 2021-08-18 PROCEDURE — 76882 US LMTD JT/FCL EVL NVASC XTR: CPT | Mod: TC,PO,RT

## 2021-08-20 ENCOUNTER — TELEPHONE (OUTPATIENT)
Dept: SURGERY | Facility: CLINIC | Age: 41
End: 2021-08-20

## 2021-08-20 LAB
BACTERIAL VAGINOSIS DNA: NEGATIVE
C TRACH DNA SPEC QL NAA+PROBE: NOT DETECTED
CANDIDA GLABRATA DNA: NEGATIVE
CANDIDA KRUSEI DNA: NEGATIVE
CANDIDA RRNA VAG QL PROBE: NEGATIVE
N GONORRHOEA DNA SPEC QL NAA+PROBE: NOT DETECTED
T VAGINALIS RRNA GENITAL QL PROBE: NEGATIVE

## 2021-08-25 ENCOUNTER — TELEPHONE (OUTPATIENT)
Dept: OBSTETRICS AND GYNECOLOGY | Facility: CLINIC | Age: 41
End: 2021-08-25

## 2021-08-27 ENCOUNTER — TELEPHONE (OUTPATIENT)
Dept: OBSTETRICS AND GYNECOLOGY | Facility: CLINIC | Age: 41
End: 2021-08-27

## 2021-08-27 ENCOUNTER — OFFICE VISIT (OUTPATIENT)
Dept: SURGERY | Facility: CLINIC | Age: 41
End: 2021-08-27
Payer: COMMERCIAL

## 2021-08-27 VITALS
SYSTOLIC BLOOD PRESSURE: 137 MMHG | HEART RATE: 87 BPM | BODY MASS INDEX: 29.08 KG/M2 | DIASTOLIC BLOOD PRESSURE: 87 MMHG | WEIGHT: 159 LBS

## 2021-08-27 DIAGNOSIS — Q83.1 AXILLARY ACCESSORY BREAST TISSUE: Primary | ICD-10-CM

## 2021-08-27 DIAGNOSIS — M79.621 PAIN, AXILLARY, RIGHT: ICD-10-CM

## 2021-08-27 DIAGNOSIS — M79.622 PAIN IN LEFT AXILLA: ICD-10-CM

## 2021-08-27 PROCEDURE — 1159F MED LIST DOCD IN RCRD: CPT | Mod: CPTII,S$GLB,, | Performed by: NURSE PRACTITIONER

## 2021-08-27 PROCEDURE — 1160F RVW MEDS BY RX/DR IN RCRD: CPT | Mod: CPTII,S$GLB,, | Performed by: NURSE PRACTITIONER

## 2021-08-27 PROCEDURE — 3044F PR MOST RECENT HEMOGLOBIN A1C LEVEL <7.0%: ICD-10-PCS | Mod: CPTII,S$GLB,, | Performed by: NURSE PRACTITIONER

## 2021-08-27 PROCEDURE — 3079F PR MOST RECENT DIASTOLIC BLOOD PRESSURE 80-89 MM HG: ICD-10-PCS | Mod: CPTII,S$GLB,, | Performed by: NURSE PRACTITIONER

## 2021-08-27 PROCEDURE — 3008F PR BODY MASS INDEX (BMI) DOCUMENTED: ICD-10-PCS | Mod: CPTII,S$GLB,, | Performed by: NURSE PRACTITIONER

## 2021-08-27 PROCEDURE — 1159F PR MEDICATION LIST DOCUMENTED IN MEDICAL RECORD: ICD-10-PCS | Mod: CPTII,S$GLB,, | Performed by: NURSE PRACTITIONER

## 2021-08-27 PROCEDURE — 99999 PR PBB SHADOW E&M-EST. PATIENT-LVL III: ICD-10-PCS | Mod: PBBFAC,,, | Performed by: NURSE PRACTITIONER

## 2021-08-27 PROCEDURE — 3075F PR MOST RECENT SYSTOLIC BLOOD PRESS GE 130-139MM HG: ICD-10-PCS | Mod: CPTII,S$GLB,, | Performed by: NURSE PRACTITIONER

## 2021-08-27 PROCEDURE — 3079F DIAST BP 80-89 MM HG: CPT | Mod: CPTII,S$GLB,, | Performed by: NURSE PRACTITIONER

## 2021-08-27 PROCEDURE — 99999 PR PBB SHADOW E&M-EST. PATIENT-LVL III: CPT | Mod: PBBFAC,,, | Performed by: NURSE PRACTITIONER

## 2021-08-27 PROCEDURE — 99203 PR OFFICE/OUTPT VISIT, NEW, LEVL III, 30-44 MIN: ICD-10-PCS | Mod: S$GLB,,, | Performed by: NURSE PRACTITIONER

## 2021-08-27 PROCEDURE — 1160F PR REVIEW ALL MEDS BY PRESCRIBER/CLIN PHARMACIST DOCUMENTED: ICD-10-PCS | Mod: CPTII,S$GLB,, | Performed by: NURSE PRACTITIONER

## 2021-08-27 PROCEDURE — 99203 OFFICE O/P NEW LOW 30 MIN: CPT | Mod: S$GLB,,, | Performed by: NURSE PRACTITIONER

## 2021-08-27 PROCEDURE — 3008F BODY MASS INDEX DOCD: CPT | Mod: CPTII,S$GLB,, | Performed by: NURSE PRACTITIONER

## 2021-08-27 PROCEDURE — 3044F HG A1C LEVEL LT 7.0%: CPT | Mod: CPTII,S$GLB,, | Performed by: NURSE PRACTITIONER

## 2021-08-27 PROCEDURE — 3075F SYST BP GE 130 - 139MM HG: CPT | Mod: CPTII,S$GLB,, | Performed by: NURSE PRACTITIONER

## 2021-09-02 ENCOUNTER — TELEPHONE (OUTPATIENT)
Dept: OBSTETRICS AND GYNECOLOGY | Facility: CLINIC | Age: 41
End: 2021-09-02

## 2021-10-04 ENCOUNTER — OFFICE VISIT (OUTPATIENT)
Dept: SURGERY | Facility: CLINIC | Age: 41
End: 2021-10-04
Payer: COMMERCIAL

## 2021-10-04 ENCOUNTER — TELEPHONE (OUTPATIENT)
Dept: SURGERY | Facility: CLINIC | Age: 41
End: 2021-10-04

## 2021-10-04 VITALS
DIASTOLIC BLOOD PRESSURE: 94 MMHG | BODY MASS INDEX: 29.41 KG/M2 | HEART RATE: 95 BPM | TEMPERATURE: 98 F | SYSTOLIC BLOOD PRESSURE: 136 MMHG | HEIGHT: 62 IN | WEIGHT: 159.81 LBS

## 2021-10-04 DIAGNOSIS — Q83.1 AXILLARY ACCESSORY BREAST TISSUE: Primary | ICD-10-CM

## 2021-10-04 PROCEDURE — 3075F PR MOST RECENT SYSTOLIC BLOOD PRESS GE 130-139MM HG: ICD-10-PCS | Mod: CPTII,S$GLB,, | Performed by: SURGERY

## 2021-10-04 PROCEDURE — 4010F PR ACE/ARB THEARPY RXD/TAKEN: ICD-10-PCS | Mod: CPTII,S$GLB,, | Performed by: SURGERY

## 2021-10-04 PROCEDURE — 3080F DIAST BP >= 90 MM HG: CPT | Mod: CPTII,S$GLB,, | Performed by: SURGERY

## 2021-10-04 PROCEDURE — 1159F MED LIST DOCD IN RCRD: CPT | Mod: CPTII,S$GLB,, | Performed by: SURGERY

## 2021-10-04 PROCEDURE — 99202 OFFICE O/P NEW SF 15 MIN: CPT | Mod: S$GLB,,, | Performed by: SURGERY

## 2021-10-04 PROCEDURE — 3008F PR BODY MASS INDEX (BMI) DOCUMENTED: ICD-10-PCS | Mod: CPTII,S$GLB,, | Performed by: SURGERY

## 2021-10-04 PROCEDURE — 3080F PR MOST RECENT DIASTOLIC BLOOD PRESSURE >= 90 MM HG: ICD-10-PCS | Mod: CPTII,S$GLB,, | Performed by: SURGERY

## 2021-10-04 PROCEDURE — 99999 PR PBB SHADOW E&M-EST. PATIENT-LVL III: ICD-10-PCS | Mod: PBBFAC,,, | Performed by: SURGERY

## 2021-10-04 PROCEDURE — 99999 PR PBB SHADOW E&M-EST. PATIENT-LVL III: CPT | Mod: PBBFAC,,, | Performed by: SURGERY

## 2021-10-04 PROCEDURE — 1159F PR MEDICATION LIST DOCUMENTED IN MEDICAL RECORD: ICD-10-PCS | Mod: CPTII,S$GLB,, | Performed by: SURGERY

## 2021-10-04 PROCEDURE — 3075F SYST BP GE 130 - 139MM HG: CPT | Mod: CPTII,S$GLB,, | Performed by: SURGERY

## 2021-10-04 PROCEDURE — 3008F BODY MASS INDEX DOCD: CPT | Mod: CPTII,S$GLB,, | Performed by: SURGERY

## 2021-10-04 PROCEDURE — 4010F ACE/ARB THERAPY RXD/TAKEN: CPT | Mod: CPTII,S$GLB,, | Performed by: SURGERY

## 2021-10-04 PROCEDURE — 99202 PR OFFICE/OUTPT VISIT, NEW, LEVL II, 15-29 MIN: ICD-10-PCS | Mod: S$GLB,,, | Performed by: SURGERY

## 2021-10-04 NOTE — PROGRESS NOTES
[facility]  Department of Surgery\  Surgical Oncology       REFERRING PROVIDER: No referring provider defined for this encounter.    Chief Complaint: Consult    Subjective:   Janine Stubbs is a 41 y.o. female who presents with bilateral axilla pain. Patient states she first had this issue about 8 years ago after the birth of her son. Patient possibly had an episode of mastitis that resulted in bilateral axilla swelling. Patient states it has never bothered her, but over the past month it started causing pain. More in the left axilla than right. On 2021 bilateral axilla US showed no abnormal cystic or solid masses visualized. Last screening mammogram on 2021 read BI-RADS 1.Today patient rates her pain 6/10 and is seeking surgical consult.     Patient does not routinely do self breast exams.  Patient has not noted a change on breast exam.  Patient denies breast masses, skin changes or nipple discharge. Patient denies to previous breast biopsy. Patient denies a personal history of breast cancer. She was seen by a PA and referred to surgical oncology     GYN History:  Age of menarche was 10.   Last menstrual period was 2021.   Patient denies hormonal therapy.   Patient is .   Age of first live birth was 34.   Patient did not use hormone therapy   Patient did not breast feed.    Past Medical History:   Diagnosis Date    Anxiety     Depression 2014    was on lexapro in the past    Essential hypertension 2021    GERD (gastroesophageal reflux disease) 2014    Hypoglycemia     Mixed hyperlipidemia 2021     Past Surgical History:   Procedure Laterality Date    CARPAL TUNNEL RELEASE Right     REPAIR OF MENISCUS OF KNEE Right     arthroscopy     Current Outpatient Medications on File Prior to Visit   Medication Sig Dispense Refill    lisinopriL 10 MG tablet Take 10 mg by mouth once daily.      rosuvastatin (CRESTOR) 10 MG tablet Take 1 tablet (10 mg total) by  mouth once daily. 30 tablet 2    ibuprofen (ADVIL,MOTRIN) 800 MG tablet Take 800 mg by mouth 3 (three) times daily as needed.      lisinopril-hydrochlorothiazide (PRINZIDE,ZESTORETIC) 10-12.5 mg per tablet Take 1 tablet by mouth once daily.  6     No current facility-administered medications on file prior to visit.     Social History     Socioeconomic History    Marital status:      Spouse name: Not on file    Number of children: 1    Years of education: Not on file    Highest education level: Not on file   Occupational History    Occupation: [employer]     Employer: [employer]   Tobacco Use    Smoking status: Never Smoker    Smokeless tobacco: Never Used   Substance and Sexual Activity    Alcohol use: No    Drug use: Never    Sexual activity: Not Currently   Other Topics Concern    Not on file   Social History Narrative    Not on file     Social Determinants of Health     Financial Resource Strain:     Difficulty of Paying Living Expenses:    Food Insecurity:     Worried About Running Out of Food in the Last Year:     Ran Out of Food in the Last Year:    Transportation Needs:     Lack of Transportation (Medical):     Lack of Transportation (Non-Medical):    Physical Activity:     Days of Exercise per Week:     Minutes of Exercise per Session:    Stress:     Feeling of Stress :    Social Connections:     Frequency of Communication with Friends and Family:     Frequency of Social Gatherings with Friends and Family:     Attends Hinduism Services:     Active Member of Clubs or Organizations:     Attends Club or Organization Meetings:     Marital Status:      Family History   Problem Relation Age of Onset    Hypertension Mother     No Known Problems Father     Diabetes Maternal Grandmother     No Known Problems Brother     No Known Problems Son     No Known Problems Brother       Review of Systems: Patient denies fever, chills, chest pain, or shortness of breath. All other  "pertinent items reviewed in HPI.   Objective:   BP (!) 136/94 (BP Location: Left arm, Patient Position: Sitting, BP Method: Medium (Automatic))   Pulse 95   Temp 97.9 °F (36.6 °C) (Oral)   Ht 5' 2" (1.575 m)   Wt 72.5 kg (159 lb 13.3 oz)   BMI 29.23 kg/m²     Physical Exam   Constitutional: She is oriented to person, place, and time. She appears well-developed.   HENT:   Head: Normocephalic.   Eyes: Pupils are equal, round, and reactive to light. Right eye exhibits no discharge. Left eye exhibits no discharge.   Cardiovascular: Normal rate and regular rhythm.    Pulmonary/Chest: No respiratory distress. She has no wheezes. Right breast exhibits tenderness. Right breast exhibits no inverted nipple, no mass, no nipple discharge and no skin change. Left breast exhibits tenderness. Left breast exhibits no inverted nipple, no mass, no nipple discharge and no skin change.       Musculoskeletal: Normal range of motion.   Lymphadenopathy:     She has no cervical adenopathy.   Neurological: She is alert and oriented to person, place, and time.   Skin: Skin is warm and dry. No erythema.     Exam done in the upright and supine position.     Radiology review: Images personally reviewed by me in the clinic.   8/18/2021 US SOFT TISSUE AXILLA, LEFT; US SOFT TISSUE AXILLA, LEFT  FINDINGS:  The findings are consistent with the patient's history and characteristic of accessory breast tissue in the axilla bilaterally.  There is no abnormal cystic or solid mass visualized.     8/18/2021 US SOFT TISSUE AXILLA, LEFT; US SOFT TISSUE AXILLA, RIGHT  FINDINGS:  The findings are consistent with the patient's history and characteristic of accessory breast tissue in the axilla bilaterally.  There is no abnormal cystic or solid mass visualized.       7/14/2021 Mammo Digital Screening Bilat w/ Harjit   Findings:   This procedure was performed using tomosynthesis.   Computer-aided detection was utilized in the interpretation of this " examination.     The breasts are heterogeneously dense, which may obscure small masses. There is no evidence of suspicious masses, microcalcifications or architectural distortion.     Impression:   No mammographic evidence of malignancy.     BI-RADS Category 1: Negative    Assessment:       41 female with bilateral axillary pain  Axillary fold fat at physical exam     Plan:     Axillary pain is not going to resolved with surgery  Patient needs referral to plastic surgery, seems more like a cosmetic issue.   We explained to the patient that her breast exam is benign

## 2022-01-10 ENCOUNTER — LAB VISIT (OUTPATIENT)
Dept: PRIMARY CARE CLINIC | Facility: OTHER | Age: 42
End: 2022-01-10
Attending: INTERNAL MEDICINE
Payer: COMMERCIAL

## 2022-01-10 DIAGNOSIS — U07.1 COVID-19: Primary | ICD-10-CM

## 2022-01-10 LAB
CTP QC/QA: YES
SARS-COV-2 AG RESP QL IA.RAPID: NEGATIVE

## 2022-01-10 PROCEDURE — 87811 SARS-COV-2 COVID19 W/OPTIC: CPT

## 2022-01-10 NOTE — PROGRESS NOTES
Instructions for Patients with Confirmed or Suspected COVID-19    If you are awaiting your test result, you will either be called or it will be released to the patient portal.  If you have any questions about your test, please visit [email or web address] or call our COVID-19 information line at [phone].      Please isolate yourself at home.  You may leave home and/or return to work once the following conditions are met:    If you have symptoms and tested positive:   More than 5 days since symptoms first appeared AND   More than 24 hours fever free without medications AND       symptoms have improved   · For five days after ending isolation, masks are required.    If you had no symptoms but tested positive:   More than 5 days since the date of the first positive test. If you develop symptoms, then use the guidelines above  · For five days after ending isolation, masks are required.      Testing is not recommended if you are symptom free after completing isolation.

## 2022-03-25 DIAGNOSIS — E78.2 MIXED HYPERLIPIDEMIA: ICD-10-CM

## 2022-03-28 RX ORDER — ROSUVASTATIN CALCIUM 10 MG/1
TABLET, COATED ORAL
Qty: 30 TABLET | Refills: 0 | Status: SHIPPED | OUTPATIENT
Start: 2022-03-28

## 2022-03-28 NOTE — TELEPHONE ENCOUNTER
Pt states that she does not want to come to [place] to see Franca. Pt states that she will go back to her other PCP....

## 2023-01-20 ENCOUNTER — HOSPITAL ENCOUNTER (EMERGENCY)
Facility: HOSPITAL | Age: 43
Discharge: HOME | End: 2023-01-20
Attending: FAMILY MEDICINE
Payer: COMMERCIAL

## 2023-01-20 VITALS
RESPIRATION RATE: 16 BRPM | OXYGEN SATURATION: 99 % | HEIGHT: 61 IN | BODY MASS INDEX: 29.83 KG/M2 | SYSTOLIC BLOOD PRESSURE: 133 MMHG | TEMPERATURE: 99 F | DIASTOLIC BLOOD PRESSURE: 80 MMHG | WEIGHT: 158 LBS | HEART RATE: 80 BPM

## 2023-01-20 DIAGNOSIS — F41.9 ANXIETY: Primary | ICD-10-CM

## 2023-01-20 DIAGNOSIS — R07.9 CHEST PAIN: ICD-10-CM

## 2023-01-20 LAB
ALBUMIN SERPL BCP-MCNC: 4.9 G/DL (ref 3.5–5.2)
ALP SERPL-CCNC: 73 U/L (ref 38–126)
ALT SERPL W/O P-5'-P-CCNC: 20 U/L (ref 10–44)
ANION GAP SERPL CALC-SCNC: 7 MMOL/L (ref 8–16)
AST SERPL-CCNC: 24 U/L (ref 15–46)
B-HCG UR QL: NEGATIVE
BASOPHILS # BLD AUTO: 0.04 K/UL (ref 0–0.2)
BASOPHILS NFR BLD: 0.7 % (ref 0–1.9)
BILIRUB SERPL-MCNC: 0.4 MG/DL (ref 0.1–1)
BILIRUB UR QL STRIP: NEGATIVE
CALCIUM SERPL-MCNC: 8.7 MG/DL (ref 8.7–10.5)
CHLORIDE SERPL-SCNC: 105 MMOL/L (ref 95–110)
CLARITY UR REFRACT.AUTO: CLEAR
CO2 SERPL-SCNC: 27 MMOL/L (ref 23–29)
COLOR UR AUTO: YELLOW
CREAT SERPL-MCNC: 0.72 MG/DL (ref 0.5–1.4)
CTP QC/QA: YES
DIFFERENTIAL METHOD: NORMAL
EOSINOPHIL # BLD AUTO: 0.1 K/UL (ref 0–0.5)
EOSINOPHIL NFR BLD: 0.8 % (ref 0–8)
ERYTHROCYTE [DISTWIDTH] IN BLOOD BY AUTOMATED COUNT: 12.9 % (ref 11.5–14.5)
EST. GFR  (NO RACE VARIABLE): >60 ML/MIN/1.73 M^2
GLUCOSE SERPL-MCNC: 118 MG/DL (ref 70–110)
GLUCOSE UR QL STRIP: NEGATIVE
HCT VFR BLD AUTO: 41.7 % (ref 37–48.5)
HGB BLD-MCNC: 13.5 G/DL (ref 12–16)
HGB UR QL STRIP: ABNORMAL
IMM GRANULOCYTES # BLD AUTO: 0.02 K/UL (ref 0–0.04)
IMM GRANULOCYTES NFR BLD AUTO: 0.3 % (ref 0–0.5)
KETONES UR QL STRIP: NEGATIVE
LEUKOCYTE ESTERASE UR QL STRIP: NEGATIVE
LYMPHOCYTES # BLD AUTO: 1.5 K/UL (ref 1–4.8)
LYMPHOCYTES NFR BLD: 25.6 % (ref 18–48)
MCH RBC QN AUTO: 29 PG (ref 27–31)
MCHC RBC AUTO-ENTMCNC: 32.4 G/DL (ref 32–36)
MCV RBC AUTO: 90 FL (ref 82–98)
MICROSCOPIC COMMENT: NORMAL
MONOCYTES # BLD AUTO: 0.4 K/UL (ref 0.3–1)
MONOCYTES NFR BLD: 6.2 % (ref 4–15)
NEUTROPHILS # BLD AUTO: 4 K/UL (ref 1.8–7.7)
NEUTROPHILS NFR BLD: 66.4 % (ref 38–73)
NITRITE UR QL STRIP: NEGATIVE
NRBC BLD-RTO: 0 /100 WBC
PH UR STRIP: 8 [PH] (ref 5–8)
PLATELET # BLD AUTO: 317 K/UL (ref 150–450)
PMV BLD AUTO: 10 FL (ref 9.2–12.9)
POTASSIUM SERPL-SCNC: 3.8 MMOL/L (ref 3.5–5.1)
PROT SERPL-MCNC: 7.7 G/DL (ref 6–8.4)
PROT UR QL STRIP: NEGATIVE
RBC # BLD AUTO: 4.65 M/UL (ref 4–5.4)
RBC #/AREA URNS AUTO: 4 /HPF (ref 0–4)
SODIUM SERPL-SCNC: 139 MMOL/L (ref 136–145)
SP GR UR STRIP: <=1.005 (ref 1–1.03)
TROPONIN I SERPL-MCNC: <0.012 NG/ML (ref 0.01–0.03)
URN SPEC COLLECT METH UR: ABNORMAL
UROBILINOGEN UR STRIP-ACNC: NEGATIVE EU/DL
UUN UR-MCNC: 10 MG/DL (ref 7–17)
WBC # BLD AUTO: 5.98 K/UL (ref 3.9–12.7)

## 2023-01-20 PROCEDURE — 99284 EMERGENCY DEPT VISIT MOD MDM: CPT | Mod: 25,ER

## 2023-01-20 PROCEDURE — 81000 URINALYSIS NONAUTO W/SCOPE: CPT | Mod: ER | Performed by: FAMILY MEDICINE

## 2023-01-20 PROCEDURE — 85025 COMPLETE CBC W/AUTO DIFF WBC: CPT | Mod: ER | Performed by: FAMILY MEDICINE

## 2023-01-20 PROCEDURE — 84484 ASSAY OF TROPONIN QUANT: CPT | Mod: ER | Performed by: FAMILY MEDICINE

## 2023-01-20 PROCEDURE — 94760 N-INVAS EAR/PLS OXIMETRY 1: CPT | Mod: ER

## 2023-01-20 PROCEDURE — 93010 EKG 12-LEAD: ICD-10-PCS | Mod: ,,, | Performed by: INTERNAL MEDICINE

## 2023-01-20 PROCEDURE — 93005 ELECTROCARDIOGRAM TRACING: CPT | Mod: ER

## 2023-01-20 PROCEDURE — 25000003 PHARM REV CODE 250: Mod: ER | Performed by: FAMILY MEDICINE

## 2023-01-20 PROCEDURE — 93010 ELECTROCARDIOGRAM REPORT: CPT | Mod: ,,, | Performed by: INTERNAL MEDICINE

## 2023-01-20 PROCEDURE — 80053 COMPREHEN METABOLIC PANEL: CPT | Mod: ER | Performed by: FAMILY MEDICINE

## 2023-01-20 PROCEDURE — 81025 URINE PREGNANCY TEST: CPT | Mod: ER | Performed by: FAMILY MEDICINE

## 2023-01-20 RX ORDER — HYDROXYZINE PAMOATE 25 MG/1
25 CAPSULE ORAL
Status: COMPLETED | OUTPATIENT
Start: 2023-01-20 | End: 2023-01-20

## 2023-01-20 RX ORDER — HYDROXYZINE PAMOATE 25 MG/1
25 CAPSULE ORAL EVERY 8 HOURS PRN
Qty: 15 CAPSULE | Refills: 0 | Status: SHIPPED | OUTPATIENT
Start: 2023-01-20

## 2023-01-20 RX ADMIN — HYDROXYZINE PAMOATE 25 MG: 25 CAPSULE ORAL at 09:01

## 2023-02-27 DIAGNOSIS — Z12.31 SCREENING MAMMOGRAM, ENCOUNTER FOR: Primary | ICD-10-CM

## 2023-03-09 ENCOUNTER — HOSPITAL ENCOUNTER (OUTPATIENT)
Dept: RADIOLOGY | Facility: HOSPITAL | Age: 43
Discharge: HOME | End: 2023-03-09
Attending: OBSTETRICS & GYNECOLOGY
Payer: COMMERCIAL

## 2023-03-09 DIAGNOSIS — Z12.31 SCREENING MAMMOGRAM, ENCOUNTER FOR: ICD-10-CM

## 2023-03-09 PROCEDURE — 77067 MAMMO DIGITAL SCREENING BILAT WITH TOMO: ICD-10-PCS | Mod: 26,,, | Performed by: RADIOLOGY

## 2023-03-09 PROCEDURE — 77067 SCR MAMMO BI INCL CAD: CPT | Mod: TC,PO

## 2023-03-09 PROCEDURE — 77063 MAMMO DIGITAL SCREENING BILAT WITH TOMO: ICD-10-PCS | Mod: 26,,, | Performed by: RADIOLOGY

## 2023-03-09 PROCEDURE — 77063 BREAST TOMOSYNTHESIS BI: CPT | Mod: 26,,, | Performed by: RADIOLOGY

## 2023-03-09 PROCEDURE — 77067 SCR MAMMO BI INCL CAD: CPT | Mod: 26,,, | Performed by: RADIOLOGY

## 2024-01-25 DIAGNOSIS — Z12.31 SCREENING MAMMOGRAM, ENCOUNTER FOR: Primary | ICD-10-CM

## 2024-03-12 ENCOUNTER — HOSPITAL ENCOUNTER (OUTPATIENT)
Dept: RADIOLOGY | Facility: HOSPITAL | Age: 44
Discharge: HOME | End: 2024-03-12
Attending: OBSTETRICS & GYNECOLOGY
Payer: COMMERCIAL

## 2024-03-12 DIAGNOSIS — Z12.31 SCREENING MAMMOGRAM, ENCOUNTER FOR: ICD-10-CM

## 2024-03-12 PROCEDURE — 77063 BREAST TOMOSYNTHESIS BI: CPT | Mod: 26,,, | Performed by: RADIOLOGY

## 2024-03-12 PROCEDURE — 77063 BREAST TOMOSYNTHESIS BI: CPT | Mod: TC,PN

## 2024-03-12 PROCEDURE — 77067 SCR MAMMO BI INCL CAD: CPT | Mod: 26,,, | Performed by: RADIOLOGY

## 2025-01-27 DIAGNOSIS — Z12.31 ENCOUNTER FOR SCREENING MAMMOGRAM FOR BREAST CANCER: Primary | ICD-10-CM
